# Patient Record
Sex: MALE | Race: WHITE | NOT HISPANIC OR LATINO | Employment: FULL TIME | URBAN - METROPOLITAN AREA
[De-identification: names, ages, dates, MRNs, and addresses within clinical notes are randomized per-mention and may not be internally consistent; named-entity substitution may affect disease eponyms.]

---

## 2023-08-31 PROBLEM — R73.01 IMPAIRED FASTING GLUCOSE: Status: ACTIVE | Noted: 2023-08-31

## 2023-08-31 PROBLEM — H93.8X3 SENSATION OF FULLNESS IN BOTH EARS: Status: ACTIVE | Noted: 2023-08-31

## 2023-08-31 PROBLEM — E78.1 HYPERTRIGLYCERIDEMIA: Status: ACTIVE | Noted: 2023-08-31

## 2023-08-31 PROBLEM — K21.9 ESOPHAGEAL REFLUX: Status: ACTIVE | Noted: 2023-08-31

## 2023-08-31 PROBLEM — H44.009 EYE INFECTION: Status: ACTIVE | Noted: 2023-08-31

## 2023-08-31 PROBLEM — K22.70 BARRETT'S ESOPHAGUS: Status: ACTIVE | Noted: 2023-08-31

## 2023-08-31 PROBLEM — R31.9 HEMATURIA: Status: ACTIVE | Noted: 2023-08-31

## 2023-08-31 PROBLEM — R20.0 NUMBNESS: Status: ACTIVE | Noted: 2023-08-31

## 2023-08-31 PROBLEM — M54.9 BACK PAIN: Status: ACTIVE | Noted: 2023-08-31

## 2023-08-31 PROBLEM — H93.13 BILATERAL TINNITUS: Status: ACTIVE | Noted: 2023-08-31

## 2023-08-31 PROBLEM — H91.90 HARD OF HEARING: Status: ACTIVE | Noted: 2023-08-31

## 2023-08-31 PROBLEM — G56.00 CARPAL TUNNEL SYNDROME: Status: ACTIVE | Noted: 2023-08-31

## 2023-08-31 PROBLEM — E78.00 PURE HYPERCHOLESTEROLEMIA: Status: ACTIVE | Noted: 2023-08-31

## 2023-08-31 PROBLEM — F41.9 ANXIETY: Status: ACTIVE | Noted: 2023-08-31

## 2023-08-31 PROBLEM — M19.90 OSTEOARTHRITIS: Status: ACTIVE | Noted: 2023-08-31

## 2023-08-31 PROBLEM — M77.10 LATERAL EPICONDYLITIS: Status: ACTIVE | Noted: 2023-08-31

## 2023-08-31 PROBLEM — F41.8 MIXED ANXIETY DEPRESSIVE DISORDER: Status: ACTIVE | Noted: 2023-08-31

## 2023-08-31 PROBLEM — H90.3 SENSORINEURAL HEARING LOSS, BILATERAL: Status: ACTIVE | Noted: 2023-08-31

## 2023-08-31 RX ORDER — NEOMYCIN SULFATE, POLYMYXIN B SULFATE AND HYDROCORTISONE 3.5; 10000; 1 MG/ML; [USP'U]/ML; MG/ML
4 SUSPENSION OPHTHALMIC 2 TIMES DAILY PRN
COMMUNITY
Start: 2014-02-27

## 2023-08-31 RX ORDER — OMEPRAZOLE 20 MG/1
1 CAPSULE, DELAYED RELEASE ORAL 2 TIMES DAILY
COMMUNITY
Start: 2016-09-23 | End: 2023-11-20 | Stop reason: SDUPTHER

## 2023-08-31 RX ORDER — FLUTICASONE PROPIONATE 50 MCG
1 SPRAY, SUSPENSION (ML) NASAL DAILY
COMMUNITY

## 2023-08-31 RX ORDER — ESCITALOPRAM OXALATE 10 MG/1
1 TABLET ORAL DAILY
COMMUNITY
Start: 2023-06-01 | End: 2023-11-20 | Stop reason: ALTCHOICE

## 2023-08-31 RX ORDER — GLUCOSAM/CHONDRO/HERB 149/HYAL 750-100 MG
TABLET ORAL
COMMUNITY
End: 2024-05-20 | Stop reason: WASHOUT

## 2023-08-31 RX ORDER — NEOMYCIN SULFATE, POLYMYXIN B SULFATE, AND DEXAMETHASONE 3.5; 10000; 1 MG/G; [USP'U]/G; MG/G
1 OINTMENT OPHTHALMIC 3 TIMES DAILY PRN
COMMUNITY

## 2023-08-31 RX ORDER — NIACIN (INOSITOL NIACINATE) 400(500MG)
1 CAPSULE ORAL DAILY
COMMUNITY

## 2023-08-31 RX ORDER — PRAVASTATIN SODIUM 20 MG/1
1 TABLET ORAL DAILY
COMMUNITY
Start: 2022-11-17 | End: 2023-11-20 | Stop reason: ALTCHOICE

## 2023-08-31 RX ORDER — TURMERIC ROOT EXTRACT 500 MG
TABLET ORAL
COMMUNITY

## 2023-08-31 RX ORDER — COLESEVELAM 180 1/1
3 TABLET ORAL
COMMUNITY
Start: 2016-09-23 | End: 2023-11-20 | Stop reason: SDUPTHER

## 2023-10-16 ENCOUNTER — LAB (OUTPATIENT)
Dept: LAB | Facility: LAB | Age: 68
End: 2023-10-16
Payer: COMMERCIAL

## 2023-10-16 DIAGNOSIS — K22.70 BARRETT'S ESOPHAGUS WITHOUT DYSPLASIA: ICD-10-CM

## 2023-10-16 DIAGNOSIS — Z12.5 ENCOUNTER FOR SCREENING FOR MALIGNANT NEOPLASM OF PROSTATE: ICD-10-CM

## 2023-10-16 DIAGNOSIS — E78.00 PURE HYPERCHOLESTEROLEMIA, UNSPECIFIED: ICD-10-CM

## 2023-10-16 DIAGNOSIS — R73.01 IMPAIRED FASTING GLUCOSE: Primary | ICD-10-CM

## 2023-10-16 LAB
ALBUMIN SERPL-MCNC: 4.4 G/DL (ref 3.5–5)
ALP BLD-CCNC: 83 U/L (ref 35–125)
ALT SERPL-CCNC: 21 U/L (ref 5–40)
ANION GAP SERPL CALC-SCNC: 11 MMOL/L
AST SERPL-CCNC: 21 U/L (ref 5–40)
BILIRUB SERPL-MCNC: 1.2 MG/DL (ref 0.1–1.2)
BUN SERPL-MCNC: 19 MG/DL (ref 8–25)
CALCIUM SERPL-MCNC: 9.4 MG/DL (ref 8.5–10.4)
CHLORIDE SERPL-SCNC: 107 MMOL/L (ref 97–107)
CHOLEST SERPL-MCNC: 258 MG/DL (ref 133–200)
CHOLEST/HDLC SERPL: 5 {RATIO}
CO2 SERPL-SCNC: 26 MMOL/L (ref 24–31)
CREAT SERPL-MCNC: 1.1 MG/DL (ref 0.4–1.6)
ERYTHROCYTE [DISTWIDTH] IN BLOOD BY AUTOMATED COUNT: 13.2 % (ref 11.5–14.5)
EST. AVERAGE GLUCOSE BLD GHB EST-MCNC: 111 MG/DL
GFR SERPL CREATININE-BSD FRML MDRD: 74 ML/MIN/1.73M*2
GLUCOSE SERPL-MCNC: 99 MG/DL (ref 65–99)
HBA1C MFR BLD: 5.5 %
HCT VFR BLD AUTO: 45.6 % (ref 41–52)
HDLC SERPL-MCNC: 52 MG/DL
HGB BLD-MCNC: 14.7 G/DL (ref 13.5–17.5)
LDLC SERPL CALC-MCNC: 176 MG/DL (ref 65–130)
MAGNESIUM SERPL-MCNC: 2 MG/DL (ref 1.6–3.1)
MCH RBC QN AUTO: 30.1 PG (ref 26–34)
MCHC RBC AUTO-ENTMCNC: 32.2 G/DL (ref 32–36)
MCV RBC AUTO: 93 FL (ref 80–100)
NRBC BLD-RTO: 0 /100 WBCS (ref 0–0)
PLATELET # BLD AUTO: 245 X10*3/UL (ref 150–450)
PMV BLD AUTO: 10.5 FL (ref 7.5–11.5)
POTASSIUM SERPL-SCNC: 5.3 MMOL/L (ref 3.4–5.1)
PROT SERPL-MCNC: 6.4 G/DL (ref 5.9–7.9)
PSA SERPL-MCNC: 1.1 NG/ML
RBC # BLD AUTO: 4.88 X10*6/UL (ref 4.5–5.9)
SODIUM SERPL-SCNC: 144 MMOL/L (ref 133–145)
TRIGL SERPL-MCNC: 151 MG/DL (ref 40–150)
VIT B12 SERPL-MCNC: 360 PG/ML (ref 211–946)
WBC # BLD AUTO: 6.6 X10*3/UL (ref 4.4–11.3)

## 2023-10-16 PROCEDURE — 80061 LIPID PANEL: CPT

## 2023-10-16 PROCEDURE — 83036 HEMOGLOBIN GLYCOSYLATED A1C: CPT

## 2023-10-16 PROCEDURE — 80053 COMPREHEN METABOLIC PANEL: CPT

## 2023-10-16 PROCEDURE — 82607 VITAMIN B-12: CPT

## 2023-10-16 PROCEDURE — 85027 COMPLETE CBC AUTOMATED: CPT

## 2023-10-16 PROCEDURE — 83735 ASSAY OF MAGNESIUM: CPT

## 2023-10-16 PROCEDURE — 84153 ASSAY OF PSA TOTAL: CPT

## 2023-10-16 PROCEDURE — 36415 COLL VENOUS BLD VENIPUNCTURE: CPT

## 2023-11-20 ENCOUNTER — OFFICE VISIT (OUTPATIENT)
Dept: PRIMARY CARE | Facility: CLINIC | Age: 68
End: 2023-11-20
Payer: COMMERCIAL

## 2023-11-20 VITALS
BODY MASS INDEX: 28.14 KG/M2 | OXYGEN SATURATION: 98 % | TEMPERATURE: 97.8 F | WEIGHT: 190 LBS | HEART RATE: 54 BPM | DIASTOLIC BLOOD PRESSURE: 76 MMHG | HEIGHT: 69 IN | SYSTOLIC BLOOD PRESSURE: 144 MMHG

## 2023-11-20 DIAGNOSIS — K22.70 BARRETT'S ESOPHAGUS WITHOUT DYSPLASIA: ICD-10-CM

## 2023-11-20 DIAGNOSIS — R03.0 ELEVATED SYSTOLIC BLOOD PRESSURE READING WITHOUT DIAGNOSIS OF HYPERTENSION: ICD-10-CM

## 2023-11-20 DIAGNOSIS — E78.1 HYPERTRIGLYCERIDEMIA: ICD-10-CM

## 2023-11-20 DIAGNOSIS — R73.01 IMPAIRED FASTING BLOOD SUGAR: ICD-10-CM

## 2023-11-20 DIAGNOSIS — F41.9 ANXIETY: ICD-10-CM

## 2023-11-20 DIAGNOSIS — Z00.00 ROUTINE MEDICAL EXAM: Primary | ICD-10-CM

## 2023-11-20 DIAGNOSIS — R73.01 IMPAIRED FASTING GLUCOSE: ICD-10-CM

## 2023-11-20 DIAGNOSIS — E78.00 PURE HYPERCHOLESTEROLEMIA: ICD-10-CM

## 2023-11-20 PROBLEM — N28.1 RENAL CYST: Status: ACTIVE | Noted: 2017-05-17

## 2023-11-20 PROBLEM — R10.12 LEFT UPPER QUADRANT PAIN: Status: ACTIVE | Noted: 2019-02-19

## 2023-11-20 PROBLEM — N50.3 EPIDIDYMAL CYST: Status: ACTIVE | Noted: 2019-02-19

## 2023-11-20 PROBLEM — Z12.5 SCREENING FOR PROSTATE CANCER: Status: ACTIVE | Noted: 2023-11-20

## 2023-11-20 PROBLEM — N50.812 LEFT TESTICULAR PAIN: Status: ACTIVE | Noted: 2019-02-19

## 2023-11-20 PROBLEM — R31.0 GROSS HEMATURIA: Status: ACTIVE | Noted: 2019-02-19

## 2023-11-20 PROBLEM — F17.200 SMOKER: Status: ACTIVE | Noted: 2019-02-19

## 2023-11-20 PROBLEM — N35.819 OTHER URETHRAL STRICTURE, MALE, UNSPECIFIED SITE: Status: ACTIVE | Noted: 2019-02-19

## 2023-11-20 PROCEDURE — 99397 PER PM REEVAL EST PAT 65+ YR: CPT | Performed by: INTERNAL MEDICINE

## 2023-11-20 PROCEDURE — 90677 PCV20 VACCINE IM: CPT | Performed by: INTERNAL MEDICINE

## 2023-11-20 PROCEDURE — 1126F AMNT PAIN NOTED NONE PRSNT: CPT | Performed by: INTERNAL MEDICINE

## 2023-11-20 PROCEDURE — 1170F FXNL STATUS ASSESSED: CPT | Performed by: INTERNAL MEDICINE

## 2023-11-20 PROCEDURE — 1036F TOBACCO NON-USER: CPT | Performed by: INTERNAL MEDICINE

## 2023-11-20 PROCEDURE — 1159F MED LIST DOCD IN RCRD: CPT | Performed by: INTERNAL MEDICINE

## 2023-11-20 PROCEDURE — 93000 ELECTROCARDIOGRAM COMPLETE: CPT | Performed by: INTERNAL MEDICINE

## 2023-11-20 RX ORDER — PRAVASTATIN SODIUM 20 MG/1
20 TABLET ORAL DAILY
Qty: 90 TABLET | Refills: 2 | Status: SHIPPED | OUTPATIENT
Start: 2023-11-20 | End: 2024-11-19

## 2023-11-20 RX ORDER — OMEPRAZOLE 20 MG/1
20 CAPSULE, DELAYED RELEASE ORAL 2 TIMES DAILY
Qty: 180 CAPSULE | Refills: 2 | Status: SHIPPED | OUTPATIENT
Start: 2023-11-20 | End: 2024-11-19

## 2023-11-20 RX ORDER — COLESEVELAM 180 1/1
1875 TABLET ORAL
Qty: 540 TABLET | Refills: 2 | Status: SHIPPED | OUTPATIENT
Start: 2023-11-20 | End: 2024-11-19

## 2023-11-20 ASSESSMENT — ENCOUNTER SYMPTOMS
OCCASIONAL FEELINGS OF UNSTEADINESS: 0
SHORTNESS OF BREATH: 0
PALPITATIONS: 0
LOSS OF SENSATION IN FEET: 0
DEPRESSION: 0

## 2023-11-20 ASSESSMENT — PATIENT HEALTH QUESTIONNAIRE - PHQ9
2. FEELING DOWN, DEPRESSED OR HOPELESS: NOT AT ALL
SUM OF ALL RESPONSES TO PHQ9 QUESTIONS 1 AND 2: 0
1. LITTLE INTEREST OR PLEASURE IN DOING THINGS: NOT AT ALL

## 2023-11-20 ASSESSMENT — ACTIVITIES OF DAILY LIVING (ADL)
DOING_HOUSEWORK: INDEPENDENT
DRESSING: INDEPENDENT
MANAGING_FINANCES: INDEPENDENT
TAKING_MEDICATION: INDEPENDENT
BATHING: INDEPENDENT
GROCERY_SHOPPING: INDEPENDENT

## 2023-11-20 ASSESSMENT — PAIN SCALES - GENERAL: PAINLEVEL: 0-NO PAIN

## 2023-11-20 NOTE — LETTER
November 20, 2023     Patient: Yovanny Coleman   YOB: 1955   Date of Visit: 11/20/2023       To Whom It May Concern:    Yovanny Coleman was seen in my clinic on 11/20/2023 at 8:30 am. Please excuse Yovanny for his absence from work on this day to make the appointment.    If you have any questions or concerns, please don't hesitate to call.         Sincerely,         Siddhartha Ramos MD        CC: No Recipients

## 2023-11-20 NOTE — PATIENT INSTRUCTIONS
RSV (respiratory syncytial virus),  COVID 19 new vaccine 11-12/23.  All can be obtained at the local pharmacies.    Prevnar 20 vaccine today.      Get labs done 1 week prior to follow up.    Diagnoses and all orders for this visit:  Routine medical exam  Comments:  Work note seen today.  Hypertriglyceridemia  Pure hypercholesterolemia  Comments:  Resume pravastatin 20 mg to lower risk of heart attack, stroke, etc.  Orders:  -     Lipid Panel; Future  -     Comprehensive Metabolic Panel; Future  -     pravastatin (Pravachol) 20 mg tablet; Take 1 tablet (20 mg) by mouth once daily.  -     colesevelam (WelChoL) 625 mg tablet; Take 3 tablets (1,875 mg) by mouth 2 times a day with meals.  Impaired fasting glucose  -     Hemoglobin A1C; Future  -     CBC; Future  Walsh's esophagus without dysplasia  -     omeprazole (PriLOSEC) 20 mg DR capsule; Take 1 capsule (20 mg) by mouth 2 times a day.  Anxiety  Comments:  doing well off medication. No medications to effect driving.  Elevated systolic blood pressure reading without diagnosis of hypertension  Comments:  Ideal blood pressure is less than 130/80.  Where at 144.  Limit alcohol, salt and exercise to help drive down numbers.OMRON BP arm cuff left arm check 1-2x mo.  Orders:  -     ECG 12 lead (Clinic Performed)  Impaired fasting blood sugar  Other orders  -     Pneumococcal conjugate vaccine, 20-valent (PREVNAR 20)

## 2023-11-20 NOTE — PROGRESS NOTES
[facility]: [facility]  PHYSICAL EXAM      Yovanny Coleman is a 67 y.o. male that is presenting today for Annual Exam.    Assessment/Plan   Diagnoses and all orders for this visit:  Routine medical exam  Comments:  Work note seen today.  Hypertriglyceridemia  Pure hypercholesterolemia  Comments:  Resume pravastatin 20 mg to lower risk of heart attack, stroke, etc.  Orders:  -     Lipid Panel; Future  -     Comprehensive Metabolic Panel; Future  Impaired fasting glucose  -     Hemoglobin A1C; Future  -     CBC; Future  Walsh's esophagus without dysplasia  Anxiety  Comments:  doing well off medication. No medications to effect driving.  Elevated systolic blood pressure reading without diagnosis of hypertension  Comments:  Ideal blood pressure is less than 130/80.  Where at 144.  Limit alcohol, salt and exercise to help drive down numbers.OMRON BP arm cuff left arm check 1-2x mo.  Orders:  -     ECG 12 lead (Clinic Performed)  Other orders  -     Pneumococcal conjugate vaccine, 20-valent (PREVNAR 20)    Subjective   Wellness visit. Over all health status doing well. Diet reviewed, Mediterranean, low sugar diet suggested. No  active depression, or little interest in doing activites or hopelessness. Home safety reviewed, well light, no throw rugs,etc. No falls. Advanced directives filled out at home.  ADL, and instrumental ADL no limits doing well. Vision screen eye exam yearly, hearing aids using.  No cognitive decline observed. Screening sheet given.    Review of Systems   Respiratory:  Negative for shortness of breath.    Cardiovascular:  Negative for chest pain and palpitations.   All other systems reviewed and are negative.     Objective   Vitals:    11/20/23 0840   BP: 144/76   Pulse: 54   Temp: 36.6 °C (97.8 °F)   SpO2: 98%     Body mass index is 27.86 kg/m².  Physical Exam  Constitutional:       General: He is not in acute distress.     Appearance: He is not toxic-appearing.   HENT:       Head: Normocephalic and atraumatic.      Right Ear: Tympanic membrane and ear canal normal.      Left Ear: Tympanic membrane and ear canal normal.      Nose: Nose normal.      Mouth/Throat:      Pharynx: Oropharynx is clear.   Eyes:      Extraocular Movements: Extraocular movements intact.      Pupils: Pupils are equal, round, and reactive to light.   Cardiovascular:      Rate and Rhythm: Normal rate and regular rhythm.      Heart sounds: Normal heart sounds. No murmur heard.  Pulmonary:      Breath sounds: Normal breath sounds.   Abdominal:      General: Bowel sounds are normal. There is no distension.      Palpations: Abdomen is soft. There is no mass.      Tenderness: There is no abdominal tenderness.   Musculoskeletal:         General: No swelling or tenderness.      Right lower leg: No edema.      Left lower leg: No edema.   Skin:     General: Skin is warm and dry.   Neurological:      General: No focal deficit present.      Mental Status: He is oriented to person, place, and time.      Sensory: No sensory deficit.      Motor: No weakness.      Deep Tendon Reflexes: Reflexes normal.     Diagnostic Results   Lab Results   Component Value Date    GLUCOSE 99 10/16/2023    CALCIUM 9.4 10/16/2023     10/16/2023    K 5.3 (H) 10/16/2023    CO2 26 10/16/2023     10/16/2023    BUN 19 10/16/2023    CREATININE 1.10 10/16/2023     Lab Results   Component Value Date    ALT 21 10/16/2023    AST 21 10/16/2023    ALKPHOS 83 10/16/2023    BILITOT 1.2 10/16/2023     Lab Results   Component Value Date    WBC 6.6 10/16/2023    HGB 14.7 10/16/2023    HCT 45.6 10/16/2023    MCV 93 10/16/2023     10/16/2023     Lab Results   Component Value Date    CHOL 258 (H) 10/16/2023    CHOL 244 (H) 01/17/2023    CHOL 237 (H) 10/10/2022     Lab Results   Component Value Date    HDL 52.0 10/16/2023    HDL 50 01/17/2023    HDL 48 10/10/2022     Lab Results   Component Value Date    LDLCALC 176 (H) 10/16/2023    LDLCALC 133 (H)  "01/17/2023    LDLCALC 145 (H) 10/10/2022     Lab Results   Component Value Date    TRIG 151 (H) 10/16/2023    TRIG 303 (H) 01/17/2023    TRIG 218 (H) 10/10/2022     No components found for: \"CHOLHDL\"  Lab Results   Component Value Date    HGBA1C 5.5 10/16/2023     Other labs not included in the list above were reviewed either before or during this encounter.    History   Past Medical History:   Diagnosis Date   • Anxiety 08/31/2023   • Walsh's esophagus 08/31/2023     [facility]  3/21 x 3 yrs PPI.   • Bilateral tinnitus 08/31/2023   • Hard of hearing 08/31/2023    hearing aids   • Hematuria     CT abd/pellvis no stones, hydro etc, cysto neg. [facility]rology '17 work up neg.   • Hypertriglyceridemia 08/31/2023   • Impaired fasting glucose 08/31/2023   • Personal history of other diseases of the digestive system     History of hiatal hernia   • Personal history of other diseases of the digestive system     History of gastroesophageal reflux (GERD)   • Personal history of other specified conditions     History of heartburn   • Pure hypercholesterolemia 08/31/2023    failed atorvastatin, Zeti SE, Low dose pravastatin cramps but need to take.     Past Surgical History:   Procedure Laterality Date   • CYST REMOVAL      left ganglion cyst x2   • OTHER SURGICAL HISTORY  02/19/2020    Tonsillectomy     No family history on file.  Social History     Socioeconomic History   • Marital status:      Spouse name: Not on file   • Number of children: Not on file   • Years of education: Not on file   • Highest education level: Not on file   Occupational History   • Not on file   Tobacco Use   • Smoking status: Never     Passive exposure: Never   • Smokeless tobacco: Never   Vaping Use   • Vaping Use: Never used   Substance and Sexual Activity   • Alcohol use: Yes   • Drug use: Never   • Sexual activity: Not on file   Other Topics Concern   • Not on file   Social History Narrative   • Not on file     Social Determinants of " Health     Financial Resource Strain: Not on file   Food Insecurity: Not on file   Transportation Needs: Not on file   Physical Activity: Not on file   Stress: Not on file   Social Connections: Not on file   Intimate Partner Violence: Not on file   Housing Stability: Not on file     Allergies   Allergen Reactions   • Atorvastatin Calcium Other     Myalgias back   • Ezetimibe Other     Myalgias   • Other Unknown     PNC   • Penicillins Unknown   • Zocor [Simvastatin] Other     Myalgias     Current Outpatient Medications on File Prior to Visit   Medication Sig Dispense Refill   • coenzyme Q10-vitamin E (Co Q-10, with Vit E,) 100-5 mg-unit capsule Take 1 capsule by mouth once daily.     • colesevelam (WelChoL) 625 mg tablet Take 3 tablets (1,875 mg) by mouth 2 times a day with meals.     • fluticasone (Flonase) 50 mcg/actuation nasal spray Administer 1 spray into each nostril once daily.     • multivit-min/iron/folic acid/K (BARIATRIC MULTIVITAMINS ORAL) Take 1 tablet by mouth once daily.     • neomycin-polymyxin B-dexameth (Maxitrol) 3.5 mg/g-10,000 unit/g-0.1 % ointment ophthalmic ointment Apply 1 Application to affected eye(s) 3 times a day as needed.     • neomycin-polymyxin-HC (Cortisporin) 3.5-10,000-10 mg-unit-mg/mL ophthalmic suspension 4 drops 2 times a day as needed. Ear Otic     • omega 3-dha-epa-fish oil (Fish OiL) 1,000 mg (120 mg-180 mg) capsule Take by mouth.     • omeprazole (PriLOSEC) 20 mg DR capsule Take 1 capsule (20 mg) by mouth 2 times a day.     • turmeric root extract 500 mg tablet as directed Orally     • vitamin-B complex split tablet Take by mouth.     • [DISCONTINUED] escitalopram (Lexapro) 10 mg tablet Take 1 tablet (10 mg) by mouth once daily.     • [DISCONTINUED] pravastatin (Pravachol) 20 mg tablet Take 1 tablet (20 mg) by mouth once daily.       No current facility-administered medications on file prior to visit.     Immunization History   Administered Date(s) Administered   • Hepatitis  B vaccine, pediatric/adolescent (RECOMBIVAX, ENGERIX) 05/09/2008   • Influenza, injectable, quadrivalent 09/19/2019, 10/01/2019, 09/22/2020   • Influenza, seasonal, injectable 10/01/2012, 10/02/2013   • Moderna SARS-CoV-2 Vaccination 12/29/2020, 01/26/2021, 03/03/2022   • Pfizer COVID-19 vaccine, bivalent, age 12 years and older (30 mcg/0.3 mL) 12/11/2022   • Td (adult), unspecified 08/24/2006   • Td vaccine, age 7 years and older (TDVAX) 01/01/2006   • Tdap vaccine, age 7 year and older (BOOSTRIX) 08/24/2012, 06/25/2015   • Zoster vaccine, recombinant, adult (SHINGRIX) 10/14/2019, 05/05/2020   • Zoster, live 08/24/2012     Patient's medical history was reviewed and updated either before or during this encounter.       Siddhartha Ramos MD

## 2024-05-06 ENCOUNTER — LAB (OUTPATIENT)
Dept: LAB | Facility: LAB | Age: 69
End: 2024-05-06
Payer: COMMERCIAL

## 2024-05-06 DIAGNOSIS — R73.01 IMPAIRED FASTING GLUCOSE: ICD-10-CM

## 2024-05-06 DIAGNOSIS — E78.00 PURE HYPERCHOLESTEROLEMIA: ICD-10-CM

## 2024-05-06 LAB
ALBUMIN SERPL-MCNC: 4.5 G/DL (ref 3.5–5)
ALP BLD-CCNC: 87 U/L (ref 35–125)
ALT SERPL-CCNC: 17 U/L (ref 5–40)
ANION GAP SERPL CALC-SCNC: 13 MMOL/L
AST SERPL-CCNC: 18 U/L (ref 5–40)
BILIRUB SERPL-MCNC: 0.9 MG/DL (ref 0.1–1.2)
BUN SERPL-MCNC: 18 MG/DL (ref 8–25)
CALCIUM SERPL-MCNC: 9.5 MG/DL (ref 8.5–10.4)
CHLORIDE SERPL-SCNC: 106 MMOL/L (ref 97–107)
CHOLEST SERPL-MCNC: 191 MG/DL (ref 133–200)
CHOLEST/HDLC SERPL: 3.8 {RATIO}
CO2 SERPL-SCNC: 24 MMOL/L (ref 24–31)
CREAT SERPL-MCNC: 1.2 MG/DL (ref 0.4–1.6)
EGFRCR SERPLBLD CKD-EPI 2021: 67 ML/MIN/1.73M*2
ERYTHROCYTE [DISTWIDTH] IN BLOOD BY AUTOMATED COUNT: 13.6 % (ref 11.5–14.5)
EST. AVERAGE GLUCOSE BLD GHB EST-MCNC: 105 MG/DL
GLUCOSE SERPL-MCNC: 108 MG/DL (ref 65–99)
HBA1C MFR BLD: 5.3 %
HCT VFR BLD AUTO: 47.1 % (ref 41–52)
HDLC SERPL-MCNC: 50 MG/DL
HGB BLD-MCNC: 15.2 G/DL (ref 13.5–17.5)
LDLC SERPL CALC-MCNC: 124 MG/DL (ref 65–130)
MCH RBC QN AUTO: 29.9 PG (ref 26–34)
MCHC RBC AUTO-ENTMCNC: 32.3 G/DL (ref 32–36)
MCV RBC AUTO: 93 FL (ref 80–100)
NRBC BLD-RTO: 0 /100 WBCS (ref 0–0)
PLATELET # BLD AUTO: 277 X10*3/UL (ref 150–450)
POTASSIUM SERPL-SCNC: 4.8 MMOL/L (ref 3.4–5.1)
PROT SERPL-MCNC: 6.7 G/DL (ref 5.9–7.9)
RBC # BLD AUTO: 5.09 X10*6/UL (ref 4.5–5.9)
SODIUM SERPL-SCNC: 143 MMOL/L (ref 133–145)
TRIGL SERPL-MCNC: 85 MG/DL (ref 40–150)
WBC # BLD AUTO: 7.1 X10*3/UL (ref 4.4–11.3)

## 2024-05-06 PROCEDURE — 80053 COMPREHEN METABOLIC PANEL: CPT

## 2024-05-06 PROCEDURE — 36415 COLL VENOUS BLD VENIPUNCTURE: CPT

## 2024-05-06 PROCEDURE — 85027 COMPLETE CBC AUTOMATED: CPT

## 2024-05-06 PROCEDURE — 80061 LIPID PANEL: CPT

## 2024-05-06 PROCEDURE — 83036 HEMOGLOBIN GLYCOSYLATED A1C: CPT

## 2024-05-16 PROBLEM — H44.009 EYE INFECTION: Status: ACTIVE | Noted: 2024-05-16

## 2024-05-16 PROBLEM — H90.3 ASYMMETRICAL SENSORINEURAL HEARING LOSS: Status: ACTIVE | Noted: 2024-05-16

## 2024-05-20 ENCOUNTER — OFFICE VISIT (OUTPATIENT)
Dept: PRIMARY CARE | Facility: CLINIC | Age: 69
End: 2024-05-20
Payer: COMMERCIAL

## 2024-05-20 VITALS
WEIGHT: 183.5 LBS | DIASTOLIC BLOOD PRESSURE: 72 MMHG | SYSTOLIC BLOOD PRESSURE: 122 MMHG | BODY MASS INDEX: 26.9 KG/M2 | HEART RATE: 80 BPM

## 2024-05-20 DIAGNOSIS — E78.00 PURE HYPERCHOLESTEROLEMIA: ICD-10-CM

## 2024-05-20 DIAGNOSIS — K64.9 HEMORRHOIDS, UNSPECIFIED HEMORRHOID TYPE: ICD-10-CM

## 2024-05-20 DIAGNOSIS — K22.70 BARRETT'S ESOPHAGUS WITHOUT DYSPLASIA: ICD-10-CM

## 2024-05-20 DIAGNOSIS — R03.0 ELEVATED SYSTOLIC BLOOD PRESSURE READING WITHOUT DIAGNOSIS OF HYPERTENSION: Primary | ICD-10-CM

## 2024-05-20 DIAGNOSIS — Z12.5 SCREENING FOR PROSTATE CANCER: ICD-10-CM

## 2024-05-20 DIAGNOSIS — R73.01 IMPAIRED FASTING GLUCOSE: ICD-10-CM

## 2024-05-20 PROCEDURE — 1157F ADVNC CARE PLAN IN RCRD: CPT | Performed by: INTERNAL MEDICINE

## 2024-05-20 PROCEDURE — 1126F AMNT PAIN NOTED NONE PRSNT: CPT | Performed by: INTERNAL MEDICINE

## 2024-05-20 PROCEDURE — 99214 OFFICE O/P EST MOD 30 MIN: CPT | Performed by: INTERNAL MEDICINE

## 2024-05-20 PROCEDURE — 1036F TOBACCO NON-USER: CPT | Performed by: INTERNAL MEDICINE

## 2024-05-20 ASSESSMENT — PATIENT HEALTH QUESTIONNAIRE - PHQ9
SUM OF ALL RESPONSES TO PHQ9 QUESTIONS 1 AND 2: 0
2. FEELING DOWN, DEPRESSED OR HOPELESS: NOT AT ALL
1. LITTLE INTEREST OR PLEASURE IN DOING THINGS: NOT AT ALL

## 2024-05-20 ASSESSMENT — ENCOUNTER SYMPTOMS
LOSS OF SENSATION IN FEET: 0
PALPITATIONS: 0
OCCASIONAL FEELINGS OF UNSTEADINESS: 0
DEPRESSION: 0
SHORTNESS OF BREATH: 0

## 2024-05-20 ASSESSMENT — PAIN SCALES - GENERAL: PAINLEVEL: 0-NO PAIN

## 2024-05-20 NOTE — PATIENT INSTRUCTIONS
follow up December    Diagnoses and all orders for this visit:  Elevated systolic blood pressure reading without diagnosis of hypertension  Comments:  BP doing OK. Bring in readings on follow up.  Screening for prostate cancer  Comments:  10/23 PSA 1.10  Orders:  -     PSA; Future  Walsh's esophagus without dysplasia  Comments:  EGD 10/25 . Will discuss getting colonoscopy recheck las 3/21 polyp 3 year recheck?  Impaired fasting glucose  Comments:  low sugar diet.  Orders:  -     CBC and Auto Differential; Future  -     Hemoglobin A1C; Future  Pure hypercholesterolemia  Comments:  On Pravastatin doing OK.  Orders:  -     Comprehensive Metabolic Panel; Future  -     Lipid Panel; Future  Hemorrhoids, unspecified hemorrhoid type  Comments:  Will discuss with  about GI options for hemorrhoids. If not bothering, surgery can be tough.  Other orders  -     Follow Up In Primary Care - Established

## 2024-05-20 NOTE — PROGRESS NOTES
[facility]: [facility]  PROGRESS NOTE      Yovanny Coleman is a 68 y.o. male that is presenting today for Follow-up (HTN).    Assessment/Plan   Diagnoses and all orders for this visit:  Elevated systolic blood pressure reading without diagnosis of hypertension  Comments:  BP doing OK. Bring in readings on follow up.  Screening for prostate cancer  Comments:  10/23 PSA 1.10  Orders:  -     PSA; Future  Walsh's esophagus without dysplasia  Comments:  EGD 10/25 . Will discuss getting colonoscopy recheck las 3/21 polyp 3 year recheck?  Impaired fasting glucose  Comments:  low sugar diet.  Orders:  -     CBC and Auto Differential; Future  -     Hemoglobin A1C; Future  Pure hypercholesterolemia  Comments:  On Pravastatin doing OK.  Orders:  -     Comprehensive Metabolic Panel; Future  -     Lipid Panel; Future  Hemorrhoids, unspecified hemorrhoid type  Comments:  Will discuss with  about GI options for hemorrhoids. If not bothering, surgery can be tough.  Other orders  -     Follow Up In Primary Care - Established    Subjective   Follow up BP doing better., IFBS, some GERD, colonoscopy over due? Mild hemorrhoids. Mild itching, no bleeding.    Review of Systems   Respiratory:  Negative for shortness of breath.    Cardiovascular:  Negative for chest pain and palpitations.   All other systems reviewed and are negative.     Objective   Vitals:    05/20/24 0831   BP: 122/72   Pulse: 80      Body mass index is 26.9 kg/m².  Physical Exam  Constitutional:       General: He is not in acute distress.  HENT:      Head: Normocephalic and atraumatic.      Right Ear: Tympanic membrane normal.      Left Ear: Tympanic membrane normal.      Mouth/Throat:      Mouth: Mucous membranes are moist.      Pharynx: Oropharynx is clear.   Eyes:      Extraocular Movements: Extraocular movements intact.      Conjunctiva/sclera: Conjunctivae normal.      Pupils: Pupils are equal, round, and reactive to light.  "  Cardiovascular:      Rate and Rhythm: Normal rate and regular rhythm.   Pulmonary:      Breath sounds: Normal breath sounds.   Abdominal:      General: Bowel sounds are normal.      Palpations: Abdomen is soft. There is no mass.   Musculoskeletal:         General: Normal range of motion.      Cervical back: Neck supple. No tenderness.   Skin:     General: Skin is warm and dry.   Neurological:      General: No focal deficit present.      Mental Status: He is oriented to person, place, and time.     Diagnostic Results   Lab Results   Component Value Date    GLUCOSE 108 (H) 05/06/2024    CALCIUM 9.5 05/06/2024     05/06/2024    K 4.8 05/06/2024    CO2 24 05/06/2024     05/06/2024    BUN 18 05/06/2024    CREATININE 1.20 05/06/2024     Lab Results   Component Value Date    ALT 17 05/06/2024    AST 18 05/06/2024    ALKPHOS 87 05/06/2024    BILITOT 0.9 05/06/2024     Lab Results   Component Value Date    WBC 7.1 05/06/2024    HGB 15.2 05/06/2024    HCT 47.1 05/06/2024    MCV 93 05/06/2024     05/06/2024     Lab Results   Component Value Date    CHOL 191 05/06/2024    CHOL 258 (H) 10/16/2023    CHOL 244 (H) 01/17/2023     Lab Results   Component Value Date    HDL 50.0 05/06/2024    HDL 52.0 10/16/2023    HDL 50 01/17/2023     Lab Results   Component Value Date    LDLCALC 124 05/06/2024    LDLCALC 176 (H) 10/16/2023    LDLCALC 133 (H) 01/17/2023     Lab Results   Component Value Date    TRIG 85 05/06/2024    TRIG 151 (H) 10/16/2023    TRIG 303 (H) 01/17/2023     No components found for: \"CHOLHDL\"  Lab Results   Component Value Date    HGBA1C 5.3 05/06/2024     Other labs not included in the list above were reviewed either before or during this encounter.    History    Past Medical History:   Diagnosis Date   • Anxiety 08/31/2023   • Walsh's esophagus 08/31/2023     [facility]  10/22 x 3 yrs PPI.   • Bilateral tinnitus 08/31/2023   • Elevated systolic blood pressure reading without diagnosis of " hypertension 11/20/2023    Ideal blood pressure is less than 130/80.  Where at 144.  Limit alcohol, salt and exercise to help drive down numbers.OMRON BP arm cuff left arm check 1-2x mo. ECG NSR   • Hard of hearing 08/31/2023    hearing aids   • Hematuria     CT abd/pellvis no stones, hydro etc, cysto neg. [facility] '17 work up neg.   • Hypertriglyceridemia 08/31/2023   • Impaired fasting glucose 08/31/2023   • Jaundice    • Personal history of other diseases of the digestive system     History of hiatal hernia   • Personal history of other diseases of the digestive system     History of gastroesophageal reflux (GERD)   • Personal history of other specified conditions     History of heartburn   • Pure hypercholesterolemia 08/31/2023    failed atorvastatin, Zeti SE, Low dose pravastatin cramps but need to take.   • Screening for prostate cancer 11/20/2023    10/23 PSA 1.10     Past Surgical History:   Procedure Laterality Date   • CYST REMOVAL      left ganglion cyst x2   • OTHER SURGICAL HISTORY  02/19/2020    Tonsillectomy     No family history on file.  Social History     Socioeconomic History   • Marital status:      Spouse name: Not on file   • Number of children: Not on file   • Years of education: Not on file   • Highest education level: Not on file   Occupational History   • Not on file   Tobacco Use   • Smoking status: Never     Passive exposure: Never   • Smokeless tobacco: Never   Vaping Use   • Vaping status: Never Used   Substance and Sexual Activity   • Alcohol use: Yes   • Drug use: Never   • Sexual activity: Not on file   Other Topics Concern   • Not on file   Social History Narrative   • Not on file     Social Determinants of Health     Financial Resource Strain: Not on file   Food Insecurity: Not on file   Transportation Needs: Not on file   Physical Activity: Not on file   Stress: Not on file   Social Connections: Not on file   Intimate Partner Violence: Not on file   Housing Stability:  Not on file     Allergies   Allergen Reactions   • Atorvastatin Calcium Other     Myalgias back   • Ezetimibe Other     Myalgias   • Other Unknown     PNC   • Penicillins Unknown   • Zocor [Simvastatin] Other     Myalgias     Current Outpatient Medications on File Prior to Visit   Medication Sig Dispense Refill   • coenzyme Q10-vitamin E (Co Q-10, with Vit E,) 100-5 mg-unit capsule Take 1 capsule by mouth once daily.     • colesevelam (WelChoL) 625 mg tablet Take 3 tablets (1,875 mg) by mouth 2 times a day with meals. 540 tablet 2   • fluticasone (Flonase) 50 mcg/actuation nasal spray Administer 1 spray into each nostril once daily.     • multivit-min/iron/folic acid/K (BARIATRIC MULTIVITAMINS ORAL) Take 1 tablet by mouth once daily.     • neomycin-polymyxin B-dexameth (Maxitrol) 3.5 mg/g-10,000 unit/g-0.1 % ointment ophthalmic ointment Apply 1 Application to affected eye(s) 3 times a day as needed.     • neomycin-polymyxin-HC (Cortisporin) 3.5-10,000-10 mg-unit-mg/mL ophthalmic suspension 4 drops 2 times a day as needed. Ear Otic     • omeprazole (PriLOSEC) 20 mg DR capsule Take 1 capsule (20 mg) by mouth 2 times a day. 180 capsule 2   • pravastatin (Pravachol) 20 mg tablet Take 1 tablet (20 mg) by mouth once daily. 90 tablet 2   • turmeric root extract 500 mg tablet as directed Orally     • [DISCONTINUED] omega 3-dha-epa-fish oil (Fish OiL) 1,000 mg (120 mg-180 mg) capsule Take by mouth.     • [DISCONTINUED] vitamin-B complex split tablet Take by mouth.       No current facility-administered medications on file prior to visit.     Immunization History   Administered Date(s) Administered   • Hepatitis B vaccine, pediatric/adolescent (RECOMBIVAX, ENGERIX) 05/09/2008   • Influenza, injectable, quadrivalent 09/19/2019, 10/01/2019, 09/22/2020   • Influenza, seasonal, injectable 10/01/2012, 10/02/2013   • Moderna SARS-CoV-2 Vaccination 12/29/2020, 01/26/2021, 03/03/2022   • Pfizer COVID-19 vaccine, Fall 2023, 12 years  and older, (30mcg/0.3mL) 01/10/2024   • Pfizer COVID-19 vaccine, bivalent, age 12 years and older (30 mcg/0.3 mL) 12/11/2022   • Pneumococcal conjugate vaccine, 20-valent (PREVNAR 20) 11/20/2023   • RSV, 60 Years And Older (AREXVY) 01/10/2024   • Td (adult), unspecified 08/24/2006   • Td vaccine, age 7 years and older (TDVAX) 01/01/2006   • Tdap vaccine, age 7 year and older (BOOSTRIX, ADACEL) 08/24/2012, 06/25/2015   • Zoster vaccine, recombinant, adult (SHINGRIX) 10/14/2019, 05/05/2020   • Zoster, live 08/24/2012     Patient's medical history was reviewed and updated either before or during this encounter.       Siddhartha Ramos MD

## 2024-08-26 DIAGNOSIS — E78.00 PURE HYPERCHOLESTEROLEMIA: ICD-10-CM

## 2024-08-27 RX ORDER — PRAVASTATIN SODIUM 20 MG/1
20 TABLET ORAL DAILY
Qty: 100 TABLET | Refills: 2 | Status: SHIPPED | OUTPATIENT
Start: 2024-08-27 | End: 2025-08-27

## 2024-11-22 ENCOUNTER — LAB (OUTPATIENT)
Dept: LAB | Facility: LAB | Age: 69
End: 2024-11-22
Payer: MEDICARE

## 2024-11-22 DIAGNOSIS — E78.00 PURE HYPERCHOLESTEROLEMIA: ICD-10-CM

## 2024-11-22 DIAGNOSIS — R73.01 IMPAIRED FASTING GLUCOSE: ICD-10-CM

## 2024-11-22 DIAGNOSIS — Z12.5 SCREENING FOR PROSTATE CANCER: ICD-10-CM

## 2024-11-22 LAB
ALBUMIN SERPL BCP-MCNC: 4.6 G/DL (ref 3.4–5)
ALP SERPL-CCNC: 58 U/L (ref 33–136)
ALT SERPL W P-5'-P-CCNC: 21 U/L (ref 10–52)
ANION GAP SERPL CALCULATED.3IONS-SCNC: 11 MMOL/L (ref 10–20)
AST SERPL W P-5'-P-CCNC: 23 U/L (ref 9–39)
BASOPHILS # BLD AUTO: 0.05 X10*3/UL (ref 0–0.1)
BASOPHILS NFR BLD AUTO: 0.7 %
BILIRUB SERPL-MCNC: 1.5 MG/DL (ref 0–1.2)
BUN SERPL-MCNC: 18 MG/DL (ref 6–23)
CALCIUM SERPL-MCNC: 9.5 MG/DL (ref 8.6–10.3)
CHLORIDE SERPL-SCNC: 108 MMOL/L (ref 98–107)
CHOLEST SERPL-MCNC: 222 MG/DL (ref 0–199)
CHOLEST/HDLC SERPL: 4.5 {RATIO}
CO2 SERPL-SCNC: 27 MMOL/L (ref 21–32)
CREAT SERPL-MCNC: 1.03 MG/DL (ref 0.5–1.3)
EGFRCR SERPLBLD CKD-EPI 2021: 80 ML/MIN/1.73M*2
EOSINOPHIL # BLD AUTO: 0.31 X10*3/UL (ref 0–0.7)
EOSINOPHIL NFR BLD AUTO: 4.6 %
ERYTHROCYTE [DISTWIDTH] IN BLOOD BY AUTOMATED COUNT: 12.7 % (ref 11.5–14.5)
EST. AVERAGE GLUCOSE BLD GHB EST-MCNC: 114 MG/DL
GLUCOSE SERPL-MCNC: 88 MG/DL (ref 74–99)
HBA1C MFR BLD: 5.6 %
HCT VFR BLD AUTO: 43.6 % (ref 41–52)
HDLC SERPL-MCNC: 49 MG/DL
HGB BLD-MCNC: 14.6 G/DL (ref 13.5–17.5)
IMM GRANULOCYTES # BLD AUTO: 0.02 X10*3/UL (ref 0–0.7)
IMM GRANULOCYTES NFR BLD AUTO: 0.3 % (ref 0–0.9)
LDLC SERPL CALC-MCNC: 143 MG/DL
LYMPHOCYTES # BLD AUTO: 2 X10*3/UL (ref 1.2–4.8)
LYMPHOCYTES NFR BLD AUTO: 29.7 %
MCH RBC QN AUTO: 30.6 PG (ref 26–34)
MCHC RBC AUTO-ENTMCNC: 33.5 G/DL (ref 32–36)
MCV RBC AUTO: 91 FL (ref 80–100)
MONOCYTES # BLD AUTO: 0.89 X10*3/UL (ref 0.1–1)
MONOCYTES NFR BLD AUTO: 13.2 %
NEUTROPHILS # BLD AUTO: 3.46 X10*3/UL (ref 1.2–7.7)
NEUTROPHILS NFR BLD AUTO: 51.5 %
NON HDL CHOLESTEROL: 173 MG/DL (ref 0–149)
NRBC BLD-RTO: 0 /100 WBCS (ref 0–0)
PLATELET # BLD AUTO: 310 X10*3/UL (ref 150–450)
POTASSIUM SERPL-SCNC: 4.2 MMOL/L (ref 3.5–5.3)
PROT SERPL-MCNC: 6.6 G/DL (ref 6.4–8.2)
PSA SERPL-MCNC: 0.49 NG/ML
RBC # BLD AUTO: 4.77 X10*6/UL (ref 4.5–5.9)
SODIUM SERPL-SCNC: 142 MMOL/L (ref 136–145)
TRIGL SERPL-MCNC: 150 MG/DL (ref 0–149)
VLDL: 30 MG/DL (ref 0–40)
WBC # BLD AUTO: 6.7 X10*3/UL (ref 4.4–11.3)

## 2024-11-22 PROCEDURE — 83036 HEMOGLOBIN GLYCOSYLATED A1C: CPT

## 2024-11-22 PROCEDURE — 36415 COLL VENOUS BLD VENIPUNCTURE: CPT

## 2024-11-22 PROCEDURE — 85025 COMPLETE CBC W/AUTO DIFF WBC: CPT

## 2024-11-22 PROCEDURE — 80061 LIPID PANEL: CPT

## 2024-11-22 PROCEDURE — G0103 PSA SCREENING: HCPCS

## 2024-11-22 PROCEDURE — 80053 COMPREHEN METABOLIC PANEL: CPT

## 2024-11-27 PROBLEM — N50.812 LEFT TESTICULAR PAIN: Status: RESOLVED | Noted: 2019-02-19 | Resolved: 2024-11-27

## 2024-11-27 PROBLEM — M77.10 LATERAL EPICONDYLITIS: Status: RESOLVED | Noted: 2023-08-31 | Resolved: 2024-11-27

## 2024-11-27 PROBLEM — R31.0 GROSS HEMATURIA: Status: RESOLVED | Noted: 2019-02-19 | Resolved: 2024-11-27

## 2024-11-27 PROBLEM — F17.200 SMOKER: Status: RESOLVED | Noted: 2019-02-19 | Resolved: 2024-11-27

## 2024-11-27 PROBLEM — G56.00 CARPAL TUNNEL SYNDROME: Status: RESOLVED | Noted: 2023-08-31 | Resolved: 2024-11-27

## 2024-12-02 ENCOUNTER — APPOINTMENT (OUTPATIENT)
Dept: PRIMARY CARE | Facility: CLINIC | Age: 69
End: 2024-12-02
Payer: COMMERCIAL

## 2024-12-04 ENCOUNTER — OFFICE VISIT (OUTPATIENT)
Dept: PRIMARY CARE | Facility: CLINIC | Age: 69
End: 2024-12-04
Payer: MEDICARE

## 2024-12-04 VITALS
DIASTOLIC BLOOD PRESSURE: 75 MMHG | HEART RATE: 52 BPM | WEIGHT: 183 LBS | BODY MASS INDEX: 27.11 KG/M2 | OXYGEN SATURATION: 98 % | HEIGHT: 69 IN | SYSTOLIC BLOOD PRESSURE: 126 MMHG | TEMPERATURE: 96.8 F

## 2024-12-04 DIAGNOSIS — E55.9 VITAMIN D DEFICIENCY: ICD-10-CM

## 2024-12-04 DIAGNOSIS — M25.512 CHRONIC LEFT SHOULDER PAIN: ICD-10-CM

## 2024-12-04 DIAGNOSIS — Z76.89 ENCOUNTER TO ESTABLISH CARE WITH NEW DOCTOR: Primary | ICD-10-CM

## 2024-12-04 DIAGNOSIS — K22.70 BARRETT'S ESOPHAGUS WITHOUT DYSPLASIA: ICD-10-CM

## 2024-12-04 DIAGNOSIS — F17.200 SMOKER: ICD-10-CM

## 2024-12-04 DIAGNOSIS — E78.00 PURE HYPERCHOLESTEROLEMIA: ICD-10-CM

## 2024-12-04 DIAGNOSIS — G89.29 CHRONIC LEFT SHOULDER PAIN: ICD-10-CM

## 2024-12-04 PROCEDURE — 1159F MED LIST DOCD IN RCRD: CPT | Performed by: INTERNAL MEDICINE

## 2024-12-04 PROCEDURE — 1125F AMNT PAIN NOTED PAIN PRSNT: CPT | Performed by: INTERNAL MEDICINE

## 2024-12-04 PROCEDURE — 1157F ADVNC CARE PLAN IN RCRD: CPT | Performed by: INTERNAL MEDICINE

## 2024-12-04 PROCEDURE — 99214 OFFICE O/P EST MOD 30 MIN: CPT | Performed by: INTERNAL MEDICINE

## 2024-12-04 PROCEDURE — 3008F BODY MASS INDEX DOCD: CPT | Performed by: INTERNAL MEDICINE

## 2024-12-04 RX ORDER — OMEPRAZOLE 20 MG/1
20 CAPSULE, DELAYED RELEASE ORAL 2 TIMES DAILY
Qty: 180 CAPSULE | Refills: 2 | Status: SHIPPED | OUTPATIENT
Start: 2024-12-04

## 2024-12-04 RX ORDER — ROSUVASTATIN CALCIUM 10 MG/1
10 TABLET, COATED ORAL DAILY
Qty: 90 TABLET | Refills: 2 | Status: SHIPPED | OUTPATIENT
Start: 2024-12-04

## 2024-12-04 ASSESSMENT — PAIN SCALES - GENERAL: PAINLEVEL_OUTOF10: 3

## 2024-12-04 NOTE — PROGRESS NOTES
[facility]: [facility]  PROGRESS NOTE      Yovanny Coleman is a 69 y.o. male that is presenting today for New Patient Visit (NP transfer from [facility] needs refills./).    Assessment/Plan   Diagnoses and all orders for this visit:  Encounter to establish care with new doctor      - Reviewed patient's available records, discussed PMH, Current active problems Meds and allergies.  Pure hypercholesterolemia     Patient been taking the Pravastatin but stopped the WellChol per BW not not well controlled -- and when was increased to 40 mg his muscle cramps got worse despite the Co Q10.-- Will switch to Crestor 10 mg daily / Continue CoQ10  -     rosuvastatin (Crestor) 10 mg tablet; Take 1 tablet (10 mg) by mouth once daily.  -     Lipid Panel; Future  -     TSH with reflex to Free T4 if abnormal; Future  -     AST; Future  -     ALT; Future  Walsh's esophagus without dysplasia    Under control with current treatment   Continue the same   Rx E-scripted 90 days x 2  -     omeprazole (PriLOSEC) 20 mg DR capsule; Take 1 capsule (20 mg) by mouth 2 times a day.  Vitamin D deficiency  -     Vitamin D 25-Hydroxy,Total (for eval of Vitamin D levels); Future  Chronic left shoulder pain  -     Referral to Orthopaedic Surgery; Future  Smoker     Did not go for his chest CT/ Advised him to to have it done ASAP taking in consideration he is still actively smoking  Other orders  -     Follow Up In Primary Care; Future  Subjective   HPI    - Yovanny Coleman 69 y.o. male is here today to establish care (TE); BW results and refills       - Patient denies any symptoms or concerns at this time.       - patient denies any adverse reactions to or concerns with his/her meds.       - Problem list and medication reconciliation done today.  - V.S. Stable. No changes at this time.  - Encouraged continued diet and exercise modification.     Review of Systems   All pertinent POSITIVES as noted per HPI.  All other systems have  been reviewed and are NEGATIVE and /or Noncontributory to this patient current visit or complaint.     Objective   There were no vitals filed for this visit.   There is no height or weight on file to calculate BMI.  Physical Exam  Vitals and nursing note reviewed.   Constitutional:       Appearance: Normal appearance.   HENT:      Head: Normocephalic and atraumatic.   Neck:      Vascular: No carotid bruit.   Cardiovascular:      Rate and Rhythm: Normal rate and regular rhythm.      Pulses: Normal pulses.      Heart sounds: Normal heart sounds.   Pulmonary:      Effort: Pulmonary effort is normal.      Breath sounds: Normal breath sounds.   Abdominal:      General: Abdomen is flat. Bowel sounds are normal.      Palpations: Abdomen is soft.   Musculoskeletal:         General: No swelling. Normal range of motion.      Cervical back: Neck supple.   Lymphadenopathy:      Cervical: No cervical adenopathy.   Skin:     General: Skin is warm and dry.   Neurological:      Mental Status: He is alert.   Psychiatric:         Mood and Affect: Mood normal.       Diagnostic Results   Lab Results   Component Value Date    GLUCOSE 88 11/22/2024    CALCIUM 9.5 11/22/2024     11/22/2024    K 4.2 11/22/2024    CO2 27 11/22/2024     (H) 11/22/2024    BUN 18 11/22/2024    CREATININE 1.03 11/22/2024     Lab Results   Component Value Date    ALT 21 11/22/2024    AST 23 11/22/2024    ALKPHOS 58 11/22/2024    BILITOT 1.5 (H) 11/22/2024     Lab Results   Component Value Date    WBC 6.7 11/22/2024    HGB 14.6 11/22/2024    HCT 43.6 11/22/2024    MCV 91 11/22/2024     11/22/2024     Lab Results   Component Value Date    CHOL 222 (H) 11/22/2024    CHOL 191 05/06/2024    CHOL 258 (H) 10/16/2023     Lab Results   Component Value Date    HDL 49.0 11/22/2024    HDL 50.0 05/06/2024    HDL 52.0 10/16/2023     Lab Results   Component Value Date    LDLCALC 143 (H) 11/22/2024    LDLCALC 124 05/06/2024    LDLCALC 176 (H) 10/16/2023  "    Lab Results   Component Value Date    TRIG 150 (H) 11/22/2024    TRIG 85 05/06/2024    TRIG 151 (H) 10/16/2023     No components found for: \"CHOLHDL\"  Lab Results   Component Value Date    HGBA1C 5.6 11/22/2024     Other labs not included in the list above were reviewed either before or during this encounter.    History    Past Medical History:   Diagnosis Date    Anxiety 08/31/2023    Walsh's esophagus 08/31/2023     [facility]  10/22 x 3 yrs PPI.    Bilateral tinnitus 08/31/2023    Carpal tunnel syndrome 08/31/2023    Cervicalgia 02/04/2016    Elevated systolic blood pressure reading without diagnosis of hypertension 11/20/2023    Ideal blood pressure is less than 130/80.  Where at 144.  Limit alcohol, salt and exercise to help drive down numbers.OMRON BP arm cuff left arm check 1-2x mo. ECG NSR    Gross hematuria 02/19/2019    Hard of hearing 08/31/2023    hearing aids    Hematuria     CT abd/pellvis no stones, hydro etc, cysto neg. [facility] urology '17 work up neg.    Hypertriglyceridemia 08/31/2023    Impaired fasting glucose 08/31/2023    Jaundice     Lateral epicondylitis 08/31/2023    Left testicular pain 02/19/2019    Pain in thoracic spine 02/04/2016    Personal history of other diseases of the digestive system     History of hiatal hernia    Personal history of other diseases of the digestive system     History of gastroesophageal reflux (GERD)    Personal history of other specified conditions     History of heartburn    Pure hypercholesterolemia 08/31/2023    failed atorvastatin, Zeti SE, Low dose pravastatin cramps but need to take.    Screening for prostate cancer 11/20/2023    10/23 PSA 1.10    Smoker 02/19/2019    Testis mass 09/28/2009     Past Surgical History:   Procedure Laterality Date    CYST REMOVAL      left ganglion cyst x2    OTHER SURGICAL HISTORY  02/19/2020    Tonsillectomy     No family history on file.  Social History     Socioeconomic History    Marital status:      Spouse " name: Not on file    Number of children: Not on file    Years of education: Not on file    Highest education level: Not on file   Occupational History    Not on file   Tobacco Use    Smoking status: Never     Passive exposure: Never    Smokeless tobacco: Never   Vaping Use    Vaping status: Never Used   Substance and Sexual Activity    Alcohol use: Yes     Comment: occassionaly    Drug use: Never    Sexual activity: Not on file   Other Topics Concern    Not on file   Social History Narrative    Not on file     Social Drivers of Health     Financial Resource Strain: Not on file   Food Insecurity: Not on file   Transportation Needs: Not on file   Physical Activity: Not on file   Stress: Not on file   Social Connections: Not on file   Intimate Partner Violence: Not on file   Housing Stability: Not on file     Allergies   Allergen Reactions    Atorvastatin Calcium Other     Myalgias back    Ezetimibe Other     Myalgias    Other Unknown     PNC    Penicillins Unknown    Zocor [Simvastatin] Other     Myalgias     Current Outpatient Medications on File Prior to Visit   Medication Sig Dispense Refill    coenzyme Q10-vitamin E (Co Q-10, with Vit E,) 100-5 mg-unit capsule Take 1 capsule by mouth once daily.      fluticasone (Flonase) 50 mcg/actuation nasal spray Administer 1 spray into each nostril once daily.      multivit-min/iron/folic acid/K (BARIATRIC MULTIVITAMINS ORAL) Take 1 tablet by mouth once daily.      neomycin-polymyxin B-dexameth (Maxitrol) 3.5 mg/g-10,000 unit/g-0.1 % ointment ophthalmic ointment Apply 1 Application to affected eye(s) 3 times a day as needed.      neomycin-polymyxin-HC (Cortisporin) 3.5-10,000-10 mg-unit-mg/mL ophthalmic suspension 4 drops 2 times a day as needed. Ear Otic      pravastatin (Pravachol) 20 mg tablet Take 1 tablet (20 mg) by mouth once daily. 100 tablet 2    turmeric root extract 500 mg tablet as directed Orally      colesevelam (WelChoL) 625 mg tablet Take 3 tablets (1,875 mg)  by mouth 2 times a day with meals. 540 tablet 2    omeprazole (PriLOSEC) 20 mg DR capsule Take 1 capsule (20 mg) by mouth 2 times a day. 180 capsule 2     No current facility-administered medications on file prior to visit.     Immunization History   Administered Date(s) Administered    Hepatitis B vaccine, 19 yrs and under (RECOMBIVAX, ENGERIX) 05/09/2008    Influenza, injectable, quadrivalent 09/19/2019, 10/01/2019, 09/22/2020    Influenza, seasonal, injectable 10/01/2012, 10/02/2013    Influenza, trivalent, adjuvanted 09/26/2024    Moderna SARS-CoV-2 Vaccination 12/29/2020, 01/26/2021, 03/03/2022    Pfizer COVID-19 vaccine, 12 years and older, (30mcg/0.3mL) (Comirnaty) 01/10/2024, 09/26/2024    Pfizer COVID-19 vaccine, bivalent, age 12 years and older (30 mcg/0.3 mL) 12/11/2022    Pneumococcal conjugate vaccine, 20-valent (PREVNAR 20) 11/20/2023    RSV, 60 Years And Older (AREXVY) 01/10/2024    Td (adult), unspecified 08/24/2006    Td vaccine, age 7 years and older (TDVAX) 01/01/2006    Tdap vaccine, age 7 year and older (BOOSTRIX, ADACEL) 08/24/2012, 06/25/2015    Zoster vaccine, recombinant, adult (SHINGRIX) 10/14/2019, 05/05/2020    Zoster, live 08/24/2012     Patient's medical history was reviewed and updated either before or during this encounter.       Joshua Butler MD

## 2025-01-11 NOTE — PROGRESS NOTES
Subjective   Patient ID: Yovanny Coleman is a 69 y.o. male    Chief Complaint: No chief complaint on file.       Last Surgery: No surgery found  Date of Last Surgery: No surgery found    HPI  Yovanny Coleman is a 69 y.o. right hand dominant male presenting for left shoulder pain.    He reports he began building a deck in October 2024 when he did something to his shoulder. Prior to this injury, his shoulder was normal. He has improved since then. This initially woke him up at night. He takes NSAIDs, Motrin as needed. His main complaint is the weakness he feels.     His right shoulder is normal today. Retired state .      Objective   Patient is a well-developed, well-nourished male  in no acute distress.  Breathes with normal chest rises.  Pupils are round and symmetric today.  Awake, alert, and oriented x3.      Examination of the left shoulder today reveals the skin to be intact. There is no sign of any atrophy, lesions, or abrasions. There is no pain to palpation of the bony prominences. Cervical lymphadenopathy examined, and this was negative. Patient had 5 out of 5 wrist flexion, extension, and thumb extension bilaterally. Sensation was intact to light touch to median, ulnar, radial axillary, and musculocutaneous nerves bilaterally. Positive radial pulse bilaterally.  Range of motion of the left shoulder revealed 0-170° of forward elevation, 0-60° of external rotation, and internal rotation was to T-12. +Camp. 4+/5 Tameka's. -modified belly press test. 4+/5 RER.    Examination of the right shoulder today reveals the skin to be intact. There is no sign of any atrophy, lesions, or abrasions. There is no pain to palpation of the bony prominences. Cervical lymphadenopathy examined, and this was negative. Patient had 5 out of 5 wrist flexion, extension, and thumb extension, bilaterally. Sensation was intact to light touch to median, ulnar, radial, axillary, and musculocutaneous nerves bilaterally. Positive  radial pulse bilaterally. Provocative maneuvers are negative today. Range of motion of the right shoulder revealed 0-170° of forward elevation, 0-60° of external rotation, and internal rotation up to T-12.    Imaging:  X-rays of the left shoulder taken today personally ordered and interpreted by me show no signs of any fracture, dislocation, arthritis or proximal humerus migration.       Assessment/Plan   Left shoulder pain, unspecified chronicity  Patient with left shoulder rotator cuff and biceps tendinitis    At this time, we had a very long discussion with the patient regarding the diagnosis. Rotator cuff disease is a spectrum of disorders ranging from rotator cuff tendinitis to full-thickness rotator cuff tears. We know that some patients over the age of 50 will have symptomatic rotator cuff tears just due to overuse and general wear and tear. In general, most patients who come in with complaints such as these will get better with therapy and injections alone. The therapy should be performed 2-3 times a day and should take about 10-15 minutes to perform each time.      We are going to start with a once a day Meloxicam and our at-home exercises. He was given a handout with these today. He was also provided a written physical therapy script today     Orders Placed This Encounter    XR shoulder left 2+ views         Follow up in 3 months     Scribe Attestation  By signing my name below, IBianka Scribe   attest that this documentation has been prepared under the direction and in the presence of Jose Carlos Ahumada MD.

## 2025-01-13 ENCOUNTER — APPOINTMENT (OUTPATIENT)
Dept: ORTHOPEDIC SURGERY | Facility: CLINIC | Age: 70
End: 2025-01-13
Payer: COMMERCIAL

## 2025-01-13 ENCOUNTER — APPOINTMENT (OUTPATIENT)
Dept: ORTHOPEDIC SURGERY | Facility: CLINIC | Age: 70
End: 2025-01-13
Payer: MEDICARE

## 2025-01-13 ENCOUNTER — HOSPITAL ENCOUNTER (OUTPATIENT)
Dept: RADIOLOGY | Facility: CLINIC | Age: 70
Discharge: HOME | End: 2025-01-13
Payer: MEDICARE

## 2025-01-13 DIAGNOSIS — M25.512 CHRONIC LEFT SHOULDER PAIN: ICD-10-CM

## 2025-01-13 DIAGNOSIS — G89.29 CHRONIC LEFT SHOULDER PAIN: ICD-10-CM

## 2025-01-13 DIAGNOSIS — M75.102 TEAR OF LEFT ROTATOR CUFF, UNSPECIFIED TEAR EXTENT, UNSPECIFIED WHETHER TRAUMATIC: Primary | ICD-10-CM

## 2025-01-13 DIAGNOSIS — M25.512 LEFT SHOULDER PAIN, UNSPECIFIED CHRONICITY: ICD-10-CM

## 2025-01-13 PROCEDURE — 99214 OFFICE O/P EST MOD 30 MIN: CPT | Performed by: ORTHOPAEDIC SURGERY

## 2025-01-13 PROCEDURE — 73030 X-RAY EXAM OF SHOULDER: CPT | Mod: LEFT SIDE | Performed by: RADIOLOGY

## 2025-01-13 PROCEDURE — 73030 X-RAY EXAM OF SHOULDER: CPT | Mod: LT

## 2025-01-13 PROCEDURE — 1157F ADVNC CARE PLAN IN RCRD: CPT | Performed by: ORTHOPAEDIC SURGERY

## 2025-01-13 RX ORDER — MELOXICAM 7.5 MG/1
7.5 TABLET ORAL DAILY
Qty: 30 TABLET | Refills: 11 | Status: SHIPPED | OUTPATIENT
Start: 2025-01-13 | End: 2026-01-13

## 2025-02-25 ENCOUNTER — EVALUATION (OUTPATIENT)
Dept: PHYSICAL THERAPY | Facility: CLINIC | Age: 70
End: 2025-02-25
Payer: MEDICARE

## 2025-02-25 DIAGNOSIS — M75.102 TEAR OF LEFT ROTATOR CUFF, UNSPECIFIED TEAR EXTENT, UNSPECIFIED WHETHER TRAUMATIC: ICD-10-CM

## 2025-02-25 PROCEDURE — 97110 THERAPEUTIC EXERCISES: CPT | Mod: GP | Performed by: PHYSICAL THERAPIST

## 2025-02-25 PROCEDURE — 97161 PT EVAL LOW COMPLEX 20 MIN: CPT | Mod: GP | Performed by: PHYSICAL THERAPIST

## 2025-02-25 ASSESSMENT — PAIN - FUNCTIONAL ASSESSMENT: PAIN_FUNCTIONAL_ASSESSMENT: 0-10

## 2025-02-25 ASSESSMENT — PAIN DESCRIPTION - DESCRIPTORS: DESCRIPTORS: DULL;STABBING

## 2025-02-25 ASSESSMENT — PAIN SCALES - GENERAL: PAINLEVEL_OUTOF10: 6

## 2025-02-25 NOTE — PROGRESS NOTES
Physical Therapy Treatment    Patient Name: Yovanny Coleman  MRN: [id number]  Encounter date:  2/28/2025  Time Calculation  Start Time: 1145  Stop Time: 1230  Time Calculation (min): 45 min     PT Therapeutic Procedures Time Entry  Therapeutic Exercise Time Entry: 41    Visit Number:  2 (including evaluation)  Planned total visits: 8  Visit Authorized/insurance considerations:  2025: MEDIC[place] A/B, [place], [place] ( $0 USED PT/ST)   Progress Report due visit #8    Current Problem  Problem List Items Addressed This Visit    None  Visit Diagnoses         Codes    Tear of left rotator cuff, unspecified tear extent, unspecified whether traumatic     M75.102           Precautions  Precautions  Precautions Comment: none      Pain  Pain Assessment: 0-10  0-10 (Numeric) Pain Score: 3  Response to Interventions: No change in pain    Subjective  General  Reason for Referral: left shoulder pain  General Comment: No issues    PT  Visit  Response to Previous Treatment: Patient with no complaints from previous session.    Objective  Less reliance on UT    Treatment:       continue with Cx stretches, add UE stretches, add posture/scapular strengthening and UE strengthening, improved alignment of scapula and pt's scapular awareness     UBE, double hills, L4, 6 min 3'/3' forward/backward    Supine:  Protraction R 6#, L4# 2x10 each  Protraction + figure 8 (same as above)  Protraction with palm down infinity    Standing:  Scapular retraction, mint TB 2x10, palm up    Doorway pec stretch  2x30 60 deg, high 20s  Long axis IR/ER, 6# 20x  IR stretch with strap 10s  AROM IR, upward alternate with outward 2x10    Scapular strengthening    DNP  Reviewed with the patient the exercises he was given by the Doctor.  Pt is performing:    Shoulder flexion  S/L IR/ER  Doorway pec stretch    Reviewed    Current HEP:    Today:  Access Code: [email or web address]  URL: [email or web address]  Date: 02/25/2025  Prepared by: Mariah Rivera      Exercises  - Seated Cervical Sidebending Stretch  - 1 x daily - 5-7 x weekly - 1 sets - 3 reps - 15-20 hold  - Seated Levator Scapulae Stretch  - 1 x daily - 5-7 x weekly - 1 sets - 3 reps - 15-20 hold  - Doorway Pec Stretch at 60 Elevation  - 1 x daily - 3-5 x weekly - 1 sets - 3 reps - 15-20 hold       Access Code: [email or web address]  URL: [email or web address]  Date: 02/28/2025  Prepared by: Mariah Rivera    Exercises  - Single Arm Serratus Punches in Supine with Dumbbell  - 1 x daily - 3-5 x weekly - 2 sets - 10-15 reps  - Standing Shoulder Blade Squeeze and External Rotation with Resistance  - 1 x daily - 3-5 x weekly - 2 sets - 10-15 reps    Has patient been compliant with HEP?  Yes    Billed Treatment Times:  Therapeutic Exercise 41 min    OP EDUCATION:  Outpatient Education  Individual(s) Educated: Patient  Education Provided: Home Exercise Program  Education Comment: printouts    Assessment:  PT Assessment  Assessment Comment: Pt able to isolate scapula  Areas of improvements:   initiate and progressed HEP  Limitations/deficits:  Weakness and low level pain    Plan:     Continue with current POC/no changes    Assessment of current progress against goals:  Progressing toward functional goals    Goals:  Active       PT Problem - left shoulder pain and weakness       PT Goal 1       Start:  02/25/25    Expected End:  04/11/25       1.  Improve right Cx rotation to 60 deg  2.  Improve shoulder AROM flexion to 170, abduction 170, IR 60 deg   3.  Improve shoulder strength to 4+/5 or better at deficits  4.  Improve sitting posture with correct alignment of Cx region and shoulders  5.  Pain:  0 to 2  6.  Functional Outcome Measure:  22    LONG TERM FUNCTIONAL GOALS  1.  Pt able to perform overhead activities with light to medium weights 75% of the time with very minimal difficult and minimal to left shoulder no pain  2. Pt able to perform activities which require hem to reach forward 90% of the time with minimal  to no left shoulder pain pain.      Able to use his left arm and not favor it particularly when raising and lowering his arm normally [without compensating]  Reach forward and perform work above his shoulder  Goal is a HEP at the gym and free weights at home         PT Goal 2       Start:  02/25/25    Expected End:  05/26/25       1.  Improve right Cx rotation to 60 deg  2.  Improve shoulder AROM flexion to 170, abduction 170, IR 60 deg   3.  Improve shoulder strength to 4+/5 or better at deficits  4.  Improve sitting posture with correct alignment of Cx region and shoulders  5.  Pain:  0 to 2  6.  Functional Outcome Measure:  22    LONG TERM FUNCTIONAL GOALS  1.  Pt able to perform overhead activities with light to medium weights 75% of the time with very minimal difficult and minimal to left shoulder no pain  2. Pt able to perform activities which require hem to reach forward 90% of the time with minimal to no left shoulder pain pain.               Patient Stated Goal 1       Start:  02/25/25    Expected End:  05/26/25       Able to use his left arm and not favor it particularly when raising and lowering his arm normally [without compensating]  Reach forward and perform work above his shoulder  Goal is a HEP at the gym and free weights at home

## 2025-02-28 ENCOUNTER — TREATMENT (OUTPATIENT)
Dept: PHYSICAL THERAPY | Facility: CLINIC | Age: 70
End: 2025-02-28
Payer: MEDICARE

## 2025-02-28 DIAGNOSIS — M75.102 TEAR OF LEFT ROTATOR CUFF, UNSPECIFIED TEAR EXTENT, UNSPECIFIED WHETHER TRAUMATIC: ICD-10-CM

## 2025-02-28 PROCEDURE — 97110 THERAPEUTIC EXERCISES: CPT | Mod: GP | Performed by: PHYSICAL THERAPIST

## 2025-02-28 ASSESSMENT — PAIN SCALES - GENERAL: PAINLEVEL_OUTOF10: 3

## 2025-02-28 ASSESSMENT — PAIN - FUNCTIONAL ASSESSMENT: PAIN_FUNCTIONAL_ASSESSMENT: 0-10

## 2025-03-05 ENCOUNTER — APPOINTMENT (OUTPATIENT)
Dept: PHYSICAL THERAPY | Facility: CLINIC | Age: 70
End: 2025-03-05
Payer: MEDICARE

## 2025-03-07 ENCOUNTER — TREATMENT (OUTPATIENT)
Dept: PHYSICAL THERAPY | Facility: CLINIC | Age: 70
End: 2025-03-07
Payer: MEDICARE

## 2025-03-07 DIAGNOSIS — M75.102 TEAR OF LEFT ROTATOR CUFF, UNSPECIFIED TEAR EXTENT, UNSPECIFIED WHETHER TRAUMATIC: ICD-10-CM

## 2025-03-07 PROCEDURE — 97110 THERAPEUTIC EXERCISES: CPT | Mod: GP,CQ

## 2025-03-07 ASSESSMENT — PAIN - FUNCTIONAL ASSESSMENT: PAIN_FUNCTIONAL_ASSESSMENT: 0-10

## 2025-03-07 NOTE — PROGRESS NOTES
"    Physical Therapy Treatment    Patient Name: Yovanny Coleman  MRN: [id number]  Encounter date:  3/7/2025  Time Calculation  Start Time: 0815  Stop Time: 0848  Time Calculation (min): 33 min     PT Therapeutic Procedures Time Entry  Therapeutic Exercise Time Entry: 27    Visit Number:  3 (including evaluation)  Planned total visits: 8  Visit Authorized/insurance considerations:  2025: MEDICARE A/B, [place], [place] ( $0 USED PT/ST)   Progress Report due visit #8    Plan for next visit:  continue with Cx stretches, add UE stretches, add posture/scapular strengthening and UE strengthening, improved alignment of scapula and pt's scapular awareness     Current Problem  Problem List Items Addressed This Visit    None  Visit Diagnoses         Codes    Tear of left rotator cuff, unspecified tear extent, unspecified whether traumatic     M75.102          Precautions  Precautions  Precautions Comment: none    Pain  Pain Assessment: 0-10    Subjective  General  General Comment: \"I did a work out yesterday. I had some irritation in the trap area but it has always done this.\"  \"I think this will be my last day. I am doing so much better.\"        Objective  Flexion and abduction bilat WFL  Cervical rotation R>L     Treatment:     continue with Cx stretches, add UE stretches, add posture/scapular strengthening and UE strengthening, improved alignment of scapula and pt's scapular awareness     UBE level 3 - 3 min fwd and retro     Exercises 20\"x 2 ea    - Upper Trapezius Stretch    - middle trap stretch   - Standing Bicep Stretch at Wall    - Standing Overhead Triceps Stretch    - Standing Shoulder Posterior Capsule Stretch    - Doorway Rhomboid Stretch     Supination/pronation 10# 2x10 ea   Shoulder abduction 2# 2x10       Current HEP:    Today:  Access Code: [email or web address]  URL: [email or web address]  Date: 03/07/2025  Prepared by: Jodee Rock    Exercises  - Seated Cervical Sidebending Stretch  - 1 x daily - 5-7 x weekly - " 1 sets - 3 reps - 20-30 sec  hold  - Seated Levator Scapulae Stretch  - 1 x daily - 5-7 x weekly - 1 sets - 3 reps - 20-30 sec hold  - Doorway Pec Stretch at 60 Elevation  - 1 x daily - 3-5 x weekly - 1 sets - 3 reps - 20-30 sec hold  - Upper Trapezius Stretch  - 1 x daily - 3-5 x weekly - 1 sets - 1 reps - 20-30 sec hold  - Standing Bicep Stretch at Wall  - 1 x daily - 3-5 x weekly - 1 sets - 3 reps - 20-30 sec hold  - Standing Overhead Triceps Stretch  - 1 x daily - 3-5 x weekly - 1 sets - 3 reps - 20-30 sec hold  - Standing Shoulder Posterior Capsule Stretch  - 1 x daily - 3-5 x weekly - 1 sets - 3 reps - 20-30 sec hold  - Doorway Rhomboid Stretch  - 1 x daily - 7 x weekly - 1 sets - 3 reps - 20-30 sec hold       Access Code: [email or web address]  URL: [email or web address]  Date: 02/28/2025  Prepared by: Mariah Rivera    Exercises  - Single Arm Serratus Punches in Supine with Dumbbell  - 1 x daily - 3-5 x weekly - 2 sets - 10-15 reps  - Standing Shoulder Blade Squeeze and External Rotation with Resistance  - 1 x daily - 3-5 x weekly - 2 sets - 10-15 reps    Has patient been compliant with HEP?  Yes      OP EDUCATION:       Assessment:  Pt's response to treatment: Good   Areas of improvements:   The patient reports no pain with workouts, ROM for shoulder and cervical WFL. The patient will cancel sessions and return with in four weeks if pt has issues and to progress HEP.    Added stretches to HEP and issued handout.   Limitations/deficits:   Some upper trap discomfort with lifting.      Pain end of session:  0    Plan:     Continue with current POC/no changes  Treatment/Interventions: Education/ Instruction, Electrical stimulation, Manual therapy, Neuromuscular re-education, Taping techniques, Therapeutic activities, Therapeutic exercises, Ultrasound  PT Plan: Skilled PT  PT Frequency: 2 times per week  Duration: 12 week cert period  Onset Date: 01/13/25    Assessment of current progress against  goals:  Progressing toward functional goals    Goals:  Active       PT Problem - left shoulder pain and weakness       PT Goal 1       Start:  02/25/25    Expected End:  04/11/25       1.  Improve right Cx rotation to 60 deg  2.  Improve shoulder AROM flexion to 170, abduction 170, IR 60 deg   3.  Improve shoulder strength to 4+/5 or better at deficits  4.  Improve sitting posture with correct alignment of Cx region and shoulders  5.  Pain:  0 to 2  6.  Functional Outcome Measure:  22    LONG TERM FUNCTIONAL GOALS  1.  Pt able to perform overhead activities with light to medium weights 75% of the time with very minimal difficult and minimal to left shoulder no pain  2. Pt able to perform activities which require hem to reach forward 90% of the time with minimal to no left shoulder pain pain.      Able to use his left arm and not favor it particularly when raising and lowering his arm normally [without compensating]  Reach forward and perform work above his shoulder  Goal is a HEP at the gym and free weights at home         PT Goal 2       Start:  02/25/25    Expected End:  05/26/25       1.  Improve right Cx rotation to 60 deg  2.  Improve shoulder AROM flexion to 170, abduction 170, IR 60 deg   3.  Improve shoulder strength to 4+/5 or better at deficits  4.  Improve sitting posture with correct alignment of Cx region and shoulders  5.  Pain:  0 to 2  6.  Functional Outcome Measure:  22    LONG TERM FUNCTIONAL GOALS  1.  Pt able to perform overhead activities with light to medium weights 75% of the time with very minimal difficult and minimal to left shoulder no pain  2. Pt able to perform activities which require hem to reach forward 90% of the time with minimal to no left shoulder pain pain.               Patient Stated Goal 1       Start:  02/25/25    Expected End:  05/26/25       Able to use his left arm and not favor it particularly when raising and lowering his arm normally [without compensating]  Reach  forward and perform work above his shoulder  Goal is a HEP at the gym and free weights at home

## 2025-03-12 ENCOUNTER — APPOINTMENT (OUTPATIENT)
Dept: PHYSICAL THERAPY | Facility: CLINIC | Age: 70
End: 2025-03-12
Payer: MEDICARE

## 2025-03-14 ENCOUNTER — APPOINTMENT (OUTPATIENT)
Dept: PHYSICAL THERAPY | Facility: CLINIC | Age: 70
End: 2025-03-14
Payer: MEDICARE

## 2025-03-18 ENCOUNTER — APPOINTMENT (OUTPATIENT)
Dept: PHYSICAL THERAPY | Facility: CLINIC | Age: 70
End: 2025-03-18
Payer: MEDICARE

## 2025-03-20 ENCOUNTER — APPOINTMENT (OUTPATIENT)
Dept: PHYSICAL THERAPY | Facility: CLINIC | Age: 70
End: 2025-03-20
Payer: MEDICARE

## 2025-03-25 ENCOUNTER — APPOINTMENT (OUTPATIENT)
Dept: PHYSICAL THERAPY | Facility: CLINIC | Age: 70
End: 2025-03-25
Payer: MEDICARE

## 2025-03-27 ENCOUNTER — APPOINTMENT (OUTPATIENT)
Dept: PHYSICAL THERAPY | Facility: CLINIC | Age: 70
End: 2025-03-27
Payer: MEDICARE

## 2025-04-14 ENCOUNTER — APPOINTMENT (OUTPATIENT)
Dept: ORTHOPEDIC SURGERY | Facility: CLINIC | Age: 70
End: 2025-04-14
Payer: COMMERCIAL

## 2025-05-13 ENCOUNTER — DOCUMENTATION (OUTPATIENT)
Dept: PHYSICAL THERAPY | Facility: CLINIC | Age: 70
End: 2025-05-13
Payer: MEDICARE

## 2025-05-13 NOTE — PROGRESS NOTES
"  Physical Therapy    Discharge Summary    Name: Yovanny Coleman \"Samson\"  MRN: [id number]  : 1955  Date: 25    Discharge Summary: PT    Discharge Information: Date of discharge 2025    Therapy Summary: Pt last seen on 2025 and reports doing better and requested DC    Discharge Status: HEP     Rehab Discharge Reason: Achieved all and/or the most significant goals(s)  "

## 2025-06-04 ENCOUNTER — APPOINTMENT (OUTPATIENT)
Dept: PRIMARY CARE | Facility: CLINIC | Age: 70
End: 2025-06-04
Payer: COMMERCIAL

## 2025-07-15 LAB
25(OH)D3+25(OH)D2 SERPL-MCNC: 33 NG/ML (ref 30–100)
ALT SERPL-CCNC: 22 U/L (ref 9–46)
AST SERPL-CCNC: 21 U/L (ref 10–35)
CHOLEST SERPL-MCNC: 230 MG/DL
CHOLEST/HDLC SERPL: 4 (CALC)
HDLC SERPL-MCNC: 58 MG/DL
LDLC SERPL CALC-MCNC: 146 MG/DL (CALC)
NONHDLC SERPL-MCNC: 172 MG/DL (CALC)
TRIGL SERPL-MCNC: 135 MG/DL
TSH SERPL-ACNC: 0.69 MIU/L (ref 0.4–4.5)

## 2025-07-25 ENCOUNTER — TELEMEDICINE (OUTPATIENT)
Dept: PRIMARY CARE | Facility: CLINIC | Age: 70
End: 2025-07-25
Payer: MEDICARE

## 2025-07-25 VITALS — WEIGHT: 185 LBS | BODY MASS INDEX: 27.4 KG/M2 | HEIGHT: 69 IN

## 2025-07-25 DIAGNOSIS — R25.2 MUSCLE CRAMPS AT NIGHT: ICD-10-CM

## 2025-07-25 DIAGNOSIS — E78.00 PURE HYPERCHOLESTEROLEMIA: Primary | ICD-10-CM

## 2025-07-25 PROCEDURE — 3008F BODY MASS INDEX DOCD: CPT | Performed by: INTERNAL MEDICINE

## 2025-07-25 PROCEDURE — 1126F AMNT PAIN NOTED NONE PRSNT: CPT | Performed by: INTERNAL MEDICINE

## 2025-07-25 PROCEDURE — 1159F MED LIST DOCD IN RCRD: CPT | Performed by: INTERNAL MEDICINE

## 2025-07-25 PROCEDURE — 99213 OFFICE O/P EST LOW 20 MIN: CPT | Performed by: INTERNAL MEDICINE

## 2025-07-25 RX ORDER — PRAVASTATIN SODIUM 80 MG/1
80 TABLET ORAL DAILY
Qty: 100 TABLET | Refills: 2 | Status: SHIPPED | OUTPATIENT
Start: 2025-07-25

## 2025-07-25 ASSESSMENT — ENCOUNTER SYMPTOMS
LOSS OF SENSATION IN FEET: 0
DEPRESSION: 0
OCCASIONAL FEELINGS OF UNSTEADINESS: 0

## 2025-07-25 ASSESSMENT — PATIENT HEALTH QUESTIONNAIRE - PHQ9
1. LITTLE INTEREST OR PLEASURE IN DOING THINGS: NOT AT ALL
2. FEELING DOWN, DEPRESSED OR HOPELESS: NOT AT ALL
SUM OF ALL RESPONSES TO PHQ9 QUESTIONS 1 AND 2: 0

## 2025-07-25 ASSESSMENT — PAIN SCALES - GENERAL: PAINLEVEL_OUTOF10: 0-NO PAIN

## 2025-07-25 NOTE — PROGRESS NOTES
"[facility]: [facility]  TELEHEALTH ENCOUNTER      Yovanny Coleman is a 69 y.o. male that is presenting today for Follow-up (Discuss lab results).  This is a telehealth encounter with {Telehealth:98212::\"audio\"} technology. Patient has consented to this type of visit. Duration of encounter: *** minutes.    Assessment/Plan   {Assess/Plan SmartLinks (Optional):48676}  Subjective   HPI     This tele-visit today is regarding the patient's test abnormal results discussion and plan of Tx.        - Patient denies any X symptoms or concerns at this time.       - patient denies any adverse reactions to or concerns with his/her meds.       - Problem list and medication reconciliation done today.  - Encouraged continued diet and exercise modification.      Review of Systems   All pertinent POSITIVES as noted per HPI.  All other systems have been reviewed and are NEGATIVE and /or Noncontributory to this patient current visit or complaint.     Objective      No VS done due to telephone encounter visit.    Physical Exam   No exam done due to telephone encounter visit.      Diagnostic Results   Lab Results   Component Value Date    GLUCOSE 88 11/22/2024    CALCIUM 9.5 11/22/2024     11/22/2024    K 4.2 11/22/2024    CO2 27 11/22/2024     (H) 11/22/2024    BUN 18 11/22/2024    CREATININE 1.03 11/22/2024     Lab Results   Component Value Date    ALT 22 07/15/2025    AST 21 07/15/2025    ALKPHOS 58 11/22/2024    BILITOT 1.5 (H) 11/22/2024     Lab Results   Component Value Date    WBC 6.7 11/22/2024    HGB 14.6 11/22/2024    HCT 43.6 11/22/2024    MCV 91 11/22/2024     11/22/2024     Lab Results   Component Value Date    CHOL 230 (H) 07/15/2025    CHOL 222 (H) 11/22/2024    CHOL 191 05/06/2024     Lab Results   Component Value Date    HDL 58 07/15/2025    HDL 49.0 11/22/2024    HDL 50.0 05/06/2024     Lab Results   Component Value Date    LDLCALC 146 (H) 07/15/2025    LDLCALC 143 (H) 11/22/2024 " "   LDLCALC 124 05/06/2024     Lab Results   Component Value Date    TRIG 135 07/15/2025    TRIG 150 (H) 11/22/2024    TRIG 85 05/06/2024     No components found for: \"CHOLHDL\"  Lab Results   Component Value Date    HGBA1C 5.6 11/22/2024     Other labs not included in the list above were reviewed either before or during this encounter.    History   Medical History[1]  Surgical History[2]  Family History[3]  Social History     Socioeconomic History    Marital status:      Spouse name: Not on file    Number of children: Not on file    Years of education: Not on file    Highest education level: Not on file   Occupational History    Not on file   Tobacco Use    Smoking status: Some Days     Types: Cigars     Passive exposure: Never    Smokeless tobacco: Never   Vaping Use    Vaping status: Never Used   Substance and Sexual Activity    Alcohol use: Yes     Alcohol/week: 12.0 standard drinks of alcohol     Types: 12 Cans of beer per week     Comment: occassionaly    Drug use: Never    Sexual activity: Yes   Other Topics Concern    Not on file   Social History Narrative    Not on file     Social Drivers of Health     Financial Resource Strain: Not on file   Food Insecurity: Not on file   Transportation Needs: Not on file   Physical Activity: Not on file   Stress: Not on file   Social Connections: Not on file   Intimate Partner Violence: Not on file   Housing Stability: Not on file     Allergies[4]  Medications Ordered Prior to Encounter[5]  Immunization History   Administered Date(s) Administered    Hepatitis B vaccine, 19 yrs and under (RECOMBIVAX, ENGERIX) 05/09/2008    Influenza, injectable, quadrivalent 09/19/2019, 10/01/2019, 09/22/2020    Influenza, seasonal, injectable 10/01/2012, 10/02/2013    Influenza, trivalent, adjuvanted 09/26/2024    Moderna SARS-CoV-2 Vaccination 12/29/2020, 01/26/2021, 03/03/2022    Pfizer COVID-19 vaccine, 12 years and older, (30mcg/0.3mL) (Comirnaty) 01/10/2024, 09/26/2024    Pfizer " COVID-19 vaccine, bivalent, age 12 years and older (30 mcg/0.3 mL) 12/11/2022    Pneumococcal conjugate vaccine, 20-valent (PREVNAR 20) 11/20/2023    RSV, 60 Years And Older (AREXVY) 01/10/2024    Td (adult), unspecified 08/24/2006    Td vaccine, age 7 years and older (TDVAX) 01/01/2006    Tdap vaccine, age 7 year and older (BOOSTRIX, ADACEL) 08/24/2012, 06/25/2015    Zoster vaccine, recombinant, adult (SHINGRIX) 10/14/2019, 05/05/2020    Zoster, live 08/24/2012     Patient's medical history was reviewed and updated either before or during this encounter.       Joshua Butler MD       [1]   Past Medical History:  Diagnosis Date    Anxiety 08/31/2023    Walsh's esophagus 08/31/2023     [facility]  10/22 x 3 yrs PPI.    Bilateral tinnitus 08/31/2023    Carpal tunnel syndrome 08/31/2023    Cervicalgia 02/04/2016    Elevated systolic blood pressure reading without diagnosis of hypertension 11/20/2023    Ideal blood pressure is less than 130/80.  Where at 144.  Limit alcohol, salt and exercise to help drive down numbers.OMRON BP arm cuff left arm check 1-2x mo. ECG NSR    GERD (gastroesophageal reflux disease)     Gross hematuria 02/19/2019    Hard of hearing 08/31/2023    hearing aids    Hematuria     CT abd/pellvis no stones, hydro etc, cysto neg. [facility] urology '17 work up neg.    Hypertriglyceridemia 08/31/2023    Impaired fasting glucose 08/31/2023    Jaundice     Lateral epicondylitis 08/31/2023    Left testicular pain 02/19/2019    Pain in thoracic spine 02/04/2016    Personal history of other diseases of the digestive system     History of hiatal hernia    Personal history of other diseases of the digestive system     History of gastroesophageal reflux (GERD)    Personal history of other specified conditions     History of heartburn    Pure hypercholesterolemia 08/31/2023    failed atorvastatin, Zeti SE, Low dose pravastatin cramps but need to take.    Screening for prostate cancer 11/20/2023    10/23 PSA 1.10     Smoker 02/19/2019    Testis mass 09/28/2009   [2]   Past Surgical History:  Procedure Laterality Date    CYST REMOVAL      left ganglion cyst x2    OTHER SURGICAL HISTORY  02/19/2020    Tonsillectomy   [3]   Family History  Problem Relation Name Age of Onset    Cancer Mother marie fonseca     Cancer Brother enriqueta fonseca    [4]   Allergies  Allergen Reactions    Atorvastatin Calcium Other     Myalgias back    Ezetimibe Other     Myalgias    Other Unknown     PNC    Penicillins Unknown    Zocor [Simvastatin] Other     Myalgias   [5]   Current Outpatient Medications on File Prior to Visit   Medication Sig Dispense Refill    coenzyme Q10-vitamin E (Co Q-10, with Vit E,) 100-5 mg-unit capsule Take 1 capsule by mouth once daily.      fluticasone (Flonase) 50 mcg/actuation nasal spray Administer 1 spray into each nostril once daily.      meloxicam (Mobic) 7.5 mg tablet Take 1 tablet (7.5 mg) by mouth once daily. 30 tablet 11    multivit-min/iron/folic acid/K (BARIATRIC MULTIVITAMINS ORAL) Take 1 tablet by mouth once daily.      neomycin-polymyxin B-dexameth (Maxitrol) 3.5 mg/g-10,000 unit/g-0.1 % ointment ophthalmic ointment Apply 1 Application to affected eye(s) 3 times a day as needed.      neomycin-polymyxin-HC (Cortisporin) 3.5-10,000-10 mg-unit-mg/mL ophthalmic suspension 4 drops 2 times a day as needed. Ear Otic      omeprazole (PriLOSEC) 20 mg DR capsule Take 1 capsule (20 mg) by mouth 2 times a day. 180 capsule 2    rosuvastatin (Crestor) 10 mg tablet Take 1 tablet (10 mg) by mouth once daily. 90 tablet 2    turmeric root extract 500 mg tablet as directed Orally       No current facility-administered medications on file prior to visit.      (Crestor) 10 mg tablet Take 1 tablet (10 mg) by mouth once daily. 90 tablet 2    turmeric root extract 500 mg tablet as directed Orally       No current facility-administered medications on file prior to visit.

## 2025-08-08 DIAGNOSIS — K22.70 BARRETT'S ESOPHAGUS WITHOUT DYSPLASIA: ICD-10-CM

## 2025-08-08 NOTE — TELEPHONE ENCOUNTER
Nv-09/08/2025   Lv- 7/25/2025 w you but saw a clinic clinic doctor 8/4/2025 for a physical   Dispense: 180 capsule   Refills: 2 ordered   Pharmacy: [facility] - [address] (Ph: [phone])   Order Details  Ordered on: 12/04/24

## 2025-08-10 RX ORDER — OMEPRAZOLE 20 MG/1
20 CAPSULE, DELAYED RELEASE ORAL 2 TIMES DAILY
Qty: 180 CAPSULE | Refills: 0 | Status: SHIPPED | OUTPATIENT
Start: 2025-08-10

## 2025-09-08 ENCOUNTER — APPOINTMENT (OUTPATIENT)
Dept: PRIMARY CARE | Facility: CLINIC | Age: 70
End: 2025-09-08
Payer: MEDICARE